# Patient Record
Sex: MALE | Race: WHITE
[De-identification: names, ages, dates, MRNs, and addresses within clinical notes are randomized per-mention and may not be internally consistent; named-entity substitution may affect disease eponyms.]

---

## 2019-09-16 ENCOUNTER — HOSPITAL ENCOUNTER (INPATIENT)
Dept: HOSPITAL 41 - JD.SDS | Age: 64
LOS: 1 days | Discharge: HOME | DRG: 302 | End: 2019-09-17
Attending: ORTHOPAEDIC SURGERY | Admitting: ORTHOPAEDIC SURGERY
Payer: COMMERCIAL

## 2019-09-16 DIAGNOSIS — Z87.891: ICD-10-CM

## 2019-09-16 DIAGNOSIS — F32.9: ICD-10-CM

## 2019-09-16 DIAGNOSIS — I10: ICD-10-CM

## 2019-09-16 DIAGNOSIS — G25.81: ICD-10-CM

## 2019-09-16 DIAGNOSIS — G89.18: ICD-10-CM

## 2019-09-16 DIAGNOSIS — Z79.899: ICD-10-CM

## 2019-09-16 DIAGNOSIS — Z79.82: ICD-10-CM

## 2019-09-16 DIAGNOSIS — M17.11: Primary | ICD-10-CM

## 2019-09-16 DIAGNOSIS — G20: ICD-10-CM

## 2019-09-16 DIAGNOSIS — Z90.89: ICD-10-CM

## 2019-09-16 DIAGNOSIS — R32: ICD-10-CM

## 2019-09-16 DIAGNOSIS — G89.29: ICD-10-CM

## 2019-09-16 DIAGNOSIS — E66.9: ICD-10-CM

## 2019-09-16 DIAGNOSIS — N32.81: ICD-10-CM

## 2019-09-16 DIAGNOSIS — F41.9: ICD-10-CM

## 2019-09-16 PROCEDURE — C1713 ANCHOR/SCREW BN/BN,TIS/BN: HCPCS

## 2019-09-16 PROCEDURE — 3E0T3BZ INTRODUCTION OF ANESTHETIC AGENT INTO PERIPHERAL NERVES AND PLEXI, PERCUTANEOUS APPROACH: ICD-10-PCS

## 2019-09-16 PROCEDURE — C1776 JOINT DEVICE (IMPLANTABLE): HCPCS

## 2019-09-16 PROCEDURE — 0SRC0J9 REPLACEMENT OF RIGHT KNEE JOINT WITH SYNTHETIC SUBSTITUTE, CEMENTED, OPEN APPROACH: ICD-10-PCS | Performed by: ORTHOPAEDIC SURGERY

## 2019-09-16 RX ADMIN — Medication SCH EACH: at 20:29

## 2019-09-16 RX ADMIN — OXYCODONE HYDROCHLORIDE AND ACETAMINOPHEN PRN TAB: 5; 325 TABLET ORAL at 18:06

## 2019-09-16 RX ADMIN — KETOROLAC TROMETHAMINE PRN MG: 15 INJECTION, SOLUTION INTRAMUSCULAR; INTRAVENOUS at 18:05

## 2019-09-16 RX ADMIN — OXYCODONE HYDROCHLORIDE AND ACETAMINOPHEN PRN TAB: 5; 325 TABLET ORAL at 12:43

## 2019-09-16 RX ADMIN — CEFUROXIME ONE: 750 INJECTION, POWDER, FOR SOLUTION INTRAMUSCULAR; INTRAVENOUS at 15:06

## 2019-09-16 RX ADMIN — Medication SCH EACH: at 15:01

## 2019-09-16 RX ADMIN — CEFUROXIME ONE MG: 750 INJECTION, POWDER, FOR SOLUTION INTRAMUSCULAR; INTRAVENOUS at 08:45

## 2019-09-16 RX ADMIN — Medication SCH: at 15:01

## 2019-09-16 NOTE — PCM.SN
- Free Text/Narrative


Note: 





Right selective femoral nerve block at the adductor canal for post-procedure 

pain control


Time Out: 0924


Start: 0924


End:  0930





Chart reviewed.  Consent signed.  Questions answered. Appropriate monitors 

applied.  Time out performed. Right mid-shaft femur evaluated with ultrasound.  

Scanning medially femur, I was able to identify the femoral artery in the 

adductor canal.  The saphenous nerve was lateral to the artery. The skin was 

prepped lateral to the ultrasound probe with chlorahexadine. The 21ga 4 

insulated block needle was inserted under direct ultrasound guidance into the 

adductor canal.  20mL of 0.5% ropivacaine with 1:200,000 epinephrine was 

injected cirmcumferentially about the nerve with intermittent negative 

aspiration every 5mL.  Patient tolerated the procedure well. No complications 

noted. See pictures on progress note and vital signs on nurses notes.  Block 

completed postoperatively.  





Arianna Vasquez CRNA

## 2019-09-16 NOTE — PCM.PREANE
Preanesthetic Assessment





- Procedure


Proposed Procedure: 





Right Total Knee Arthroplasty





- Anesthesia/Transfusion/Family Hx


Anesthesia History: Prior Anesthesia Reaction


Type of Anesthesia Reaction: Other (see below) ("I woke up angry")


Transfusion History: No Prior Transfusion(s)





- Review of Systems


General: No Symptoms


Pulmonary: No Symptoms


Cardiovascular: Edema (Bilateral Edema noted to legs. Pitting Edema. Chronic/

unchanged per patient. )


Gastrointestinal: No Symptoms, Constipation (Concerned regarding postoperative 

constipation. Dr. Espinal/Ailyn, PAC aware. Plan in place to manage.)


Neurological: Numbness (at times he notices numbness in his left leg. Worsened 

by sitting in one place to long. ), Pre-Existing Deficit (Parkinsons with 

tremors. Took his medication this moring. Lower back surgery in 1985, disc 

removal. )


Other: Reports: Anxiety





- Physical Assessment


NPO Status Date: 09/15/19


NPO Status Time: 21:00


Vital Signs: 





 Last Vital Signs











Temp  36.9 C   09/16/19 06:15


 


Pulse  72   09/16/19 06:15


 


Resp  16   09/16/19 06:15


 


BP  126/59 L  09/16/19 06:15


 


Pulse Ox  95   09/16/19 06:15











Height: 1.8 m


Weight: 113.852 kg


ASA Class: 3


Mental Status: Alert & Oriented x3


Airway Class: Mallampati = 3


Dentition: Reports: Crown(s) (Caps noted to several of his front teeth. One 

temporary cap in place on a molar. All secure. )


Thyro-Mental Finger Breadths: 2


Mouth Opening Finger Breadths: 3


ROM/Head Extension: Full


Lungs: Clear to Auscultation, Normal Respiratory Effort


Cardiovascular: Regular Rate, Regular Rhythm





- Lab


Values: 





 Laboratory Last Values











PT  10.9 SECONDS (9.7-12.0)   09/16/19  06:30    


 


INR  1.00   09/16/19  06:30    


 


APTT  27 SECONDS (22-31)   09/16/19  06:30    


 


MRSA (PCR)  Negative   09/11/19  12:56    














- Allergies


Allergies/Adverse Reactions: 


 Allergies











Allergy/AdvReac Type Severity Reaction Status Date / Time


 


No Known Allergies Allergy   Verified 09/13/19 11:25














- Anesthesia Plan


Pre-Op Medication Ordered: Anxiolytic





- Acknowledgements


Anesthesia Type Planned: Spinal, Regional Block (Post operative Adductor Canal 

Block for pain control. )


Pt an Appropriate Candidate for the Planned Anesthesia: Yes


Alternatives and Risks of Anesthesia Discussed w Pt/Guardian: Yes


Pt/Guardian Understands and Agrees with Anesthesia Plan: Yes


Additional Comments: 





Dr. Vila called regarding history of abnormal stress test and edema of lower 

extremities. Stress test was indeterminant per report, he has good daily 

activity as a rancher, he did see cardiology and had an ECHO done (results 

reviewed)EF was 60%, Dr. Vila does not feel the patient needs to see 

cardiology prior to surgery. He denies chest pain/cardiac symptoms, EKG was 

done Sinus Rhythm at 68 bpm. Sonny agrees this morning and would like to proceed 

with surgery as planned. 


He does report sleeping in a recliner which is more comfortable with his 

Parkinson disease. He is agreeable to a spinal anesthetic. 





PreAnesthesia Questionnaire


HEENT History: Reports: None


Cardiovascular History: Reports: High Cholesterol


Respiratory History: Reports: Other (See Below)


Other Respiratory History: wheezing


Genitourinary History: Reports: Urinary Incontinence, Other (See Below)


Other Genitourinary History: frequency


OB/GYN History: Reports: None


Musculoskeletal History: Reports: Osteoarthritis


Neurological History: Reports: Parkinson's


Psychiatric History: Reports: Anxiety


Endocrine/Metabolic History: Reports: None


Hematologic History: Reports: None


Immunologic History: Reports: None


Oncologic (Cancer) History: Reports: None





- Past Surgical History


Head Surgeries/Procedures: Reports: None


HEENT Surgical History: Reports: None


Cardiovascular Surgical History: Reports: None


Respiratory Surgical History: Reports: None


GI Surgical History: Reports: Colonoscopy


Female  Surgical History: Reports: None


Male  Surgical History: Reports: None


Endocrine Surgical History: Reports: None


Other Neurological Surgeries/Procedures: back surgery- discectomy


Musculoskeletal Surgical History: Reports: None


Oncologic Surgical History: Reports: None


Dermatological Surgical History: Reports: Other (See Below)





- SUBSTANCE USE


Smoking Status *Q: Former Smoker


Tobacco Use Within Last Twelve Months: Snuff/Dip


Recreational Drug Use History: No





- HOME MEDS


Home Medications: 


 Home Meds





Aspirin [[place] Aspirin] 81 mg PO DAILY 09/13/19 [History]


Carbidopa/Levodopa [Carbidopa-Levodopa ] 1 tab PO QID 09/13/19 [History]


Cholecalciferol (Vitamin D3) [Vitamin D3] 5,000 unit PO DAILY 09/13/19 [History]


Losartan [Cozaar] 50 mg PO DAILY 09/13/19 [History]


Magnesium Oxide [Magnesium] 400 mg PO DAILY 09/13/19 [History]


Sertraline HCl 100 mg PO DAILY 09/13/19 [History]


Simvastatin 20 mg PO DAILY 09/13/19 [History]


rOPINIRole HCl [Ropinirole ER] 12 mg PO QAM 09/13/19 [History]











- CURRENT (IN HOUSE) MEDS


Current Meds: 





 Current Medications





Acetaminophen (Tylenol)  975 mg PO ONETIME [facility]


   Stop: 09/16/19 13:00


   Last Admin: 09/16/19 06:26 Dose:  975 mg


Bisacodyl (Dulcolax)  5 mg PO DAILY PRN


   PRN Reason: Constipation


Cyclobenzaprine HCl (Flexeril)  10 mg PO BID PRN


   PRN Reason: Spasms


Docusate Sodium (Colace)  100 mg PO BID SWAPNA


Famotidine (Pepcid)  20 mg PO Q12H [facility]


Lactated Ringer's (Ringers, Lactated)  1,000 mls @ 125 mls/hr IV ASDIRECTED [facility]


   Stop: 09/16/19 23:00


   Last Admin: 09/16/19 06:49 Dose:  125 mls/hr


Cefazolin Sodium/Dextrose 2 gm (/ Premix)  50 mls @ 100 mls/hr IV Q8H [facility]


   Stop: 09/17/19 07:29


Ketorolac Tromethamine (Toradol)  15 mg IVPUSH Q6H PRN


   PRN Reason: Pain


Lidocaine/Sodium Bicarbonate (Buffered Lidocaine 1% In Ns 8.4%)  0.25 ml IDERM 

ONETIME PRN


   PRN Reason: Prior to IV Start


   Stop: 09/16/19 18:00


   Last Admin: 09/16/19 06:49 Dose:  0.25 ml


Magnesium Hydroxide (Milk Of Magnesia)  30 ml PO BID PRN


   PRN Reason: Constipation


Morphine Sulfate (Morphine)  2 mg IVPUSH Q2H PRN


   PRN Reason: Breakthrough Pain


Naloxone HCl (Narcan)  0.1 mg IVPUSH Q5M PRN


   PRN Reason: Oversedation


Ondansetron HCl (Zofran)  4 mg IVPUSH Q6H PRN


   PRN Reason: Nausea/Vomiting


Oxycodone HCl (Oxycontin)  10 mg PO ONETIME [facility]


   Stop: 09/16/19 13:00


   Last Admin: 09/16/19 06:27 Dose:  10 mg


Oxycodone/Acetaminophen (Percocet 325-5 Mg)  1 - 2 tab PO Q4H PRN


   PRN Reason: Pain


Pregabalin (Lyrica)  50 mg PO ONETIME [facility]


   Stop: 09/16/19 13:00


   Last Admin: 09/16/19 06:26 Dose:  50 mg


Rivaroxaban (Xarelto)  10 mg PO DAILY [facility]


Senna (Senna)  8.6 mg PO BID PRN


   PRN Reason: Constipation


Sodium Chloride (Saline Flush)  10 ml FLUSH ASDIRECTED PRN


   PRN Reason: Keep Vein Open


   Stop: 09/16/19 23:00





Discontinued Medications





Acetaminophen (Tylenol)  975 mg PO ONETIME SWAP[facility]


Bupivacaine HCl (Sensorcaine-Mpf 0.25%) Confirm Administered Dose 10 ml .ROUTE 

.STK-MED ONE


   Stop: 09/16/19 06:39


Bupivacaine HCl (Sensorcaine-Mpf 0.25%) Confirm Administered Dose 20 ml .ROUTE 

.STK-MED ONE


   Stop: 09/16/19 06:59


Bupivacaine HCl (Sensorcaine-Mpf 0.25%) Confirm Administered Dose 10 ml .ROUTE 

.STK-MED ONE


   Stop: 09/16/19 07:02


Cefazolin Sodium (Ancef) Confirm Administered Dose 2 gm .ROUTE .STK-MED ONE


   Stop: 09/16/19 06:39


Cefazolin Sodium (Ancef) Confirm Administered Dose 2 gm .ROUTE .STK-MED ONE


   Stop: 09/16/19 07:06


Morphine Sulfate 8 mg/Epinephrine HCl 0.3 mg/Cefuroxime Sodium 750 mg/Ketorolac 

Tromethamine 30 mg/Sodium Chloride 27.9 ml  0 mg .XX ONETIME ONE


   Stop: 09/16/19 07:46


Epinephrine HCl (Adrenalin) Confirm Administered Dose 1 mg .ROUTE .STK-MED ONE


   Stop: 09/16/19 07:32


Lactated Ringer's (Ringers, Lactated) Confirm Administered Dose 1,000 mls @ as 

directed .ROUTE .STK-MED ONE


   Stop: 09/16/19 07:06


Lidocaine HCl (Xylocaine-Mpf 1%) Confirm Administered Dose 4 mls @ as directed 

.ROUTE .STK-MED ONE


   Stop: 09/16/19 07:07


Iodine (Iodine 2% Mild Tincture) Confirm Administered Dose 30 ml .ROUTE .STK-

MED ONE


   Stop: 09/16/19 06:39


Ketorolac Tromethamine (Toradol) Confirm Administered Dose 30 mg .ROUTE .STK-

MED ONE


   Stop: 09/16/19 07:06


Midazolam HCl (Versed 1 Mg/Ml) Confirm Administered Dose 2 mg .ROUTE .STK-MED 

ONE


   Stop: 09/16/19 07:07


Ondansetron HCl (Zofran) Confirm Administered Dose 4 mg .ROUTE .STK-MED ONE


   Stop: 09/16/19 07:06


Oxycodone HCl (Oxycontin)  10 mg PO ONETIME SWAP[facility]


Propofol (Diprivan  20 Ml) Confirm Administered Dose 400 mg .ROUTE .ST-MED ONE


   Stop: 09/16/19 07:06


Propofol (Diprivan  20 Ml) Confirm Administered Dose 200 mg .ROUTE .STK-MED ONE


   Stop: 09/16/19 07:39


Ropivacaine (Naropin 0.5%) Confirm Administered Dose 30 ml .ROUTE .ST-MED ONE


   Stop: 09/16/19 07:32


Tranexamic Acid (Cyklokapron) Confirm Administered Dose 1,000 mg .ROUTE .STK-

MED ONE


   Stop: 09/16/19 06:38


Vancomycin HCl (Vancomycin) Confirm Administered Dose 1 gm .ROUTE .STK-MED ONE


   Stop: 09/16/19 06:39

## 2019-09-16 NOTE — PCM.POSTAN
POST ANESTHESIA ASSESSMENT





- MENTAL STATUS


Mental Status: Alert, Oriented





- VITAL SIGNS


Vital Signs: 


 Last Vital Signs











Temp  36.9 C   09/16/19 06:15


 


Pulse  72   09/16/19 06:15


 


Resp  16   09/16/19 06:15


 


BP  126/59 L  09/16/19 06:15


 


Pulse Ox  95   09/16/19 06:15














- RESPIRATORY


Respiratory Status: Respiratory Rate WNL, Airway Patent, O2 Saturation Stable, 

Supplemental Oxygen





- CARDIOVASCULAR


CV Status: Pulse Rate WNL, Blood Pressure Stable





- GASTROINTESTINAL


GI Status: No Symptoms





- PAIN


Pain Score: 0





- POST OP HYDRATION


Hydration Status: Adequate & Stable

## 2019-09-16 NOTE — CR
Right knee: AP and lateral views of the right knee were obtained.

 

Comparison: No prior knee exam.

 

Knee prosthesis is seen.  Components are aligned.  Underlying bony 

structures are intact.  Soft tissue air is noted from the surgical 

procedure.

 

Impression:

1.  Satisfactory postoperative radiographic appearance of recently 

placed right knee prosthesis.

 

Diagnostic code #2

## 2019-09-17 RX ADMIN — KETOROLAC TROMETHAMINE PRN MG: 15 INJECTION, SOLUTION INTRAMUSCULAR; INTRAVENOUS at 09:32

## 2019-09-17 RX ADMIN — OXYCODONE HYDROCHLORIDE AND ACETAMINOPHEN PRN TAB: 5; 325 TABLET ORAL at 08:46

## 2019-09-17 RX ADMIN — Medication SCH EACH: at 05:52

## 2019-09-17 RX ADMIN — OXYCODONE HYDROCHLORIDE AND ACETAMINOPHEN PRN TAB: 5; 325 TABLET ORAL at 03:05

## 2019-09-17 RX ADMIN — Medication SCH EACH: at 00:17

## 2019-09-17 RX ADMIN — Medication SCH EACH: at 09:03

## 2019-09-17 RX ADMIN — Medication SCH EACH: at 15:44

## 2019-09-17 RX ADMIN — OXYCODONE HYDROCHLORIDE AND ACETAMINOPHEN PRN TAB: 5; 325 TABLET ORAL at 13:09

## 2019-09-17 NOTE — [id number]
Post Anesthesia Note





- EVALUATION WITHIN 48HRS OF ANESTHETIC


Vital Signs in Normal Range: Yes


Patient Participated in Evaluation: Yes


Respiratory Function Stable: Yes


Airway Patent: Yes


Cardiovascular Function Stable: Yes


Hydration Status Stable: Yes


Pain Control Satisfactory: Yes


Nausea and Vomiting Control Satisfactory: Yes


Mental Status Recovered: Yes (pain on and off- getting ready to shower)


Vital Signs: 


 Last Vital Signs











Temp  98.8 F   09/17/19 03:04


 


Pulse  81   09/17/19 03:04


 


Resp  20   09/17/19 03:04


 


BP  139/83   09/17/19 03:04


 


Pulse Ox  92 L  09/17/19 03:04

## 2019-09-17 NOTE — PCM.CONS
H&P History of Present Illness





- General


Date of Service: 09/17/19


Admit Problem/Dx: 


 Admission Diagnosis/Problem





Admission Diagnosis/Problem      Osteoarthritis of knee








Source of Information: Patient, Family, Old Records, Provider, RN Notes Reviewed


History Limitations: Reports: Physical Impairment





- History of Present Illness


Initial Comments - Free Text/Narative: 


This is a 62 yo white male with past medical hx/o HTN, HLD, OA/DJD, PD, RLS, OAB

, Depression and Class II Obesity who recently underwent RTKA. He is doing 

relatively well POD#1. His pain is controlled and denies any acute issues. His 

Hgb is stable at 13.9. The hospitalist services were consulted for post 

operative care.





- Related Data


Allergies/Adverse Reactions: 


 Allergies











Allergy/AdvReac Type Severity Reaction Status Date / Time


 


No Known Allergies Allergy   Verified 09/16/19 10:46











Home Medications: 


 Home Meds





Aspirin [[place] Aspirin] 81 mg PO DAILY 09/13/19 [History]


Cholecalciferol (Vitamin D3) [Vitamin D3] 5,000 unit PO DAILY 09/13/19 [History]


Losartan [Cozaar] 50 mg PO DAILY 09/13/19 [History]


Magnesium Oxide [Magnesium] 500 mg PO DAILY 09/13/19 [History]


Sertraline HCl 100 mg PO DAILY 09/13/19 [History]


Simvastatin 20 mg PO DAILY 09/13/19 [History]


rOPINIRole HCl [Ropinirole ER] 12 mg PO QAM 09/13/19 [History]


Carbidopa/Levodopa [Carbidopa-Levodopa ] 1 tab PO 5XDAY 09/16/19 [History]


Acetaminophen/oxyCODONE [Percocet 325-5 MG] 1 - 2 tab PO Q4H PRN #60 tablet 09/ 17/19 [Rx]


Bisacodyl [Dulcolax] 5 mg PO DAILY PRN  tablet 09/17/19 [Rx]


Cyclobenzaprine [Flexeril] 10 mg PO BID PRN #30 tablet 09/17/19 [Rx]


Docusate Sodium [Colace] 100 mg PO BID  cap 09/17/19 [Rx]


Famotidine [Pepcid] 20 mg PO Q12H  tablet 09/17/19 [Rx]


Magnesium Hydroxide [Milk of Magnesia] 30 ml PO BID PRN  cup 09/17/19 [Rx]


Rivaroxaban [Xarelto] 10 mg PO DAILY #30 tablet 09/17/19 [Rx]


Sennosides [Senna] 8.6 mg PO BID PRN  tablet 09/17/19 [Rx]











Past Medical History


HEENT History: Reports: None


Cardiovascular History: Reports: High Cholesterol


Respiratory History: Reports: Other (See Below)


Other Respiratory History: wheezing


Genitourinary History: Reports: Urinary Incontinence, Other (See Below)


Other Genitourinary History: frequency


OB/GYN History: Reports: None


Musculoskeletal History: Reports: Osteoarthritis


Neurological History: Reports: Parkinson's


Psychiatric History: Reports: Anxiety


Endocrine/Metabolic History: Reports: None


Hematologic History: Reports: None


Immunologic History: Reports: None


Oncologic (Cancer) History: Reports: None





- Past Surgical History


Head Surgeries/Procedures: Reports: None


HEENT Surgical History: Reports: None


Cardiovascular Surgical History: Reports: None


Respiratory Surgical History: Reports: None


GI Surgical History: Reports: Colonoscopy


Male  Surgical History: Reports: None


Endocrine Surgical History: Reports: None


Other Neurological Surgeries/Procedures: back surgery- discectomy


Musculoskeletal Surgical History: Reports: None


Oncologic Surgical History: Reports: None


Dermatological Surgical History: Reports: Other (See Below)





Social & Family History





- Family History


Family Medical History: Noncontributory





- Tobacco Use


Smoking Status *Q: Never Smoker


Used Tobacco, but Quit: Yes


Month/Year Tobacco Last Used: 1975


Second Hand Smoke Exposure: No





- Caffeine Use


Caffeine Use: Reports: None





- Alcohol Use


Days Per Week of Alcohol Use: 2


Number of Drinks Per Day: 3


Total Drinks Per Week: 6





- Recreational Drug Use


Recreational Drug Use: No


Drug Use in Last 12 Months: No





H&P Review of Systems





- Review of Systems:


Review Of Systems: See Below


General: Denies: Fever, Chills, Fatigue


HEENT: Reports: No Symptoms


Pulmonary: Denies: Shortness of Breath


Cardiovascular: Denies: Chest Pain, Dyspnea on Exertion, Lightheadedness


Gastrointestinal: Denies: Abdominal Pain, Nausea, Vomiting


Musculoskeletal: Reports: No Symptoms


Skin: Reports: No Symptoms


Psychiatric: Denies: Depression, Anxiety


Neurological: Reports: Difficulty Walking, Weakness, Gait Disturbance.  Denies: 

Confusion


Hematologic/Lymphatic: Reports: No Symptoms


Immunologic: Reports: No Symptoms





Exam





- Exam


Exam: See Below





- Vital Signs


Vital Signs: 


 Last Vital Signs











Temp  37.1 C   09/17/19 03:04


 


Pulse  81   09/17/19 03:04


 


Resp  20   09/17/19 03:04


 


BP  139/83   09/17/19 03:04


 


Pulse Ox  92 L  09/17/19 03:04











Weight: 116.165 kg





- Exam


General: Alert, Oriented, Cooperative


HEENT: Conjunctiva Clear, EACs Clear, EOMI, Hearing Intact, Mucosa Moist & Pink

, Nares Patent, Normal Nasal Septum, Posterior Pharynx Clear, Pupils Equal, 

Pupils Reactive


Neck: Supple, Trachea Midline


Lungs: Clear to Auscultation, Normal Respiratory Effort, Decreased Breath Sounds


Cardiovascular: Regular Rate, Regular Rhythm


GI/Abdominal Exam: Normal Bowel Sounds, Soft, Non-Tender, No Organomegaly, No 

Distention, No Abnormal Bruit


 (Male) Exam: Deferred


Rectal (Males) Exam: Deferred


Back Exam: Normal Inspection, Decreased Range of Motion


Extremities: Normal Inspection, Normal Range of Motion, Non-Tender, No Pedal 

Edema, Normal Capillary Refill


Peripheral Pulses: 2+: Dorsalis Pedis (L), Dorsalis Pedis (R)


Skin: Warm, Dry, Intact


Skin Alteration Location    (Drawings Not To Scale): 


  __________________________














  __________________________





 1 - dressed, covered and wrapped with polar care





Neuro Extensive - Mental Status: Oriented x3, Normal Cognition, Memory Intact


Neuro Extensive - Motor, Sensory, Reflexes: CN II-XII Intact, Abnormal Gait, 

Tremor


Psychiatric: Alert, Normal Mood.  No: Normal Affect





- Patient Data


Lab Results Last 24 hrs: 


 Laboratory Results - last 24 hr











  09/17/19 09/17/19 Range/Units





  05:30 05:30 


 


WBC  5.96   (4.23-9.07)  K/mm3


 


RBC  4.53 L   (4.63-6.08)  M/mm3


 


Hgb  13.9   (13.7-17.5)  gm/dl


 


Hct  42.6   (40.1-51.0)  %


 


MCV  94.0 H   (79.0-92.2)  fl


 


MCH  30.7   (25.7-32.2)  pg


 


MCHC  32.6   (32.2-35.5)  g/dl


 


RDW Std Deviation  46.1 H   (35.1-43.9)  fL


 


Plt Count  190   (163-337)  K/mm3


 


MPV  9.5   (9.4-12.3)  fl


 


Sodium   137  (136-145)  mEq/L


 


Potassium   4.2  (3.5-5.1)  mEq/L


 


Chloride   102  ()  mEq/L


 


Carbon Dioxide   26  (21-32)  mEq/L


 


Anion Gap   13.2  (5-15)  


 


BUN   23 H  (7-18)  mg/dL


 


Creatinine   1.2  (0.7-1.3)  mg/dL


 


Est Cr Clr Drug Dosing   66.24  mL/min


 


Estimated GFR (MDRD)   > 60  (>60)  mL/min


 


BUN/Creatinine Ratio   19.2 H  (14-18)  


 


Glucose   106  ()  mg/dL


 


Calcium   8.3 L  (8.5-10.1)  mg/dL


 


Total Bilirubin   1.0  (0.2-1.0)  mg/dL


 


AST   17  (15-37)  U/L


 


Alkaline Phosphatase   64  ()  U/L


 


Total Protein   7.1  (6.4-8.2)  g/dl


 


Albumin   3.4  (3.4-5.0)  g/dl


 


Globulin   3.7  gm/dL


 


Albumin/Globulin Ratio   0.9 L  (1-2)  











Result Diagrams: 


 09/17/19 05:30





 09/17/19 05:30





Consult PN Assessment/Plan


POD#: 1


Procedures: 


Procedures





ASSAY OF BLOOD/URIC ACID (08/21/19)


ASSAY OF CK (CPK) (04/10/14)


ASSAY OF LACTIC ACID (08/21/19)


ASSAY OF PSA TOTAL (04/10/14)


ASSAY THYROID STIM HORMONE (04/28/15)


C-REACTIVE PROTEIN (08/21/19)


CARDIOVASCULAR STRESS TEST (10/29/18)


COMPLETE CBC W/AUTO DIFF WBC (08/21/19)


COMPREHEN METABOLIC PANEL (08/21/19)


GLYCOSYLATED HEMOGLOBIN TEST (08/21/19)


HT MUSCLE IMAGE SPECT MULT (10/29/18)


LACTATE (LD) (LDH) ENZYME (04/10/14)


LIPID PANEL (08/21/19)


ROUTINE VENIPUNCTURE (08/21/19)


UR ALBUMIN QUANTITATIVE (04/28/15)


UR ALBUMIN SEMIQUANTITATIVE (04/10/14)


URINALYSIS AUTO W/O SCOPE (08/21/19)


URINALYSIS AUTO W/SCOPE (04/28/15)


VITAMIN B-12 (08/21/19)








Problem List Initiated/Reviewed/Updated: Yes


Plan: 


Assessment:


Acute:


RTKA 


   - POD #1


   - Defer Management to Primary Team


   


Post-Operative Care


   - Patient is clinically and hemodynamically stable


   - Hgb is at 13.9


   - He is drinking and eating well





Chronic: HTN, HLD, OA/DJD, PD, RLS, OAB/Urinary Incontinence, Depression and 

Obesity Class II





Plan: From the hospitalist standpoint, patient is doing relatively well. We 

recommend to continue current treatment and resume home medications. Any 

changes or further recommendations will be based on the patient course. Thank 

you for the opportunity to participate in the management of this patient. We 

will follow him along with you.








Requesting Provider: Dr. Espinal


Date Consult Requested: 09/16/19


Reason for Consult: Post Operative Care


Patient History Reviewed: Yes


Admission H&P Reviewed: Yes


Notified Requestor: Yes


Time Spent (in minutes): 15

## 2019-09-17 NOTE — [id number]
- General Info


Date of Service: 09/17/19


POD#: 1


Functional Status: Reports: Tolerating Diet, Ambulating, Urinating, Incentive 

Spirometry





- Patient Data


Vitals - Most Recent: 


 Last Vital Signs











Temp  98.8 F   09/17/19 03:04


 


Pulse  81   09/17/19 03:04


 


Resp  20   09/17/19 03:04


 


BP  139/83   09/17/19 03:04


 


Pulse Ox  92 L  09/17/19 03:04











Weight - Most Recent: 256 lb 1.6 oz


I&O - Last 24 Hours: 


 Intake & Output











 09/16/19 09/17/19 09/17/19





 22:59 06:59 14:59


 


Intake Total 1710 450 


 


Output Total 180 600 


 


Balance 1530 -150 











Lab Results Last 24 Hrs: 


 Laboratory Results - last 24 hr











  09/17/19 09/17/19 Range/Units





  05:30 05:30 


 


WBC  5.96   (4.23-9.07)  K/mm3


 


RBC  4.53 L   (4.63-6.08)  M/mm3


 


Hgb  13.9   (13.7-17.5)  gm/dl


 


Hct  42.6   (40.1-51.0)  %


 


MCV  94.0 H   (79.0-92.2)  fl


 


MCH  30.7   (25.7-32.2)  pg


 


MCHC  32.6   (32.2-35.5)  g/dl


 


RDW Std Deviation  46.1 H   (35.1-43.9)  fL


 


Plt Count  190   (163-337)  K/mm3


 


MPV  9.5   (9.4-12.3)  fl


 


Sodium   137  (136-145)  mEq/L


 


Potassium   4.2  (3.5-5.1)  mEq/L


 


Chloride   102  ()  mEq/L


 


Carbon Dioxide   26  (21-32)  mEq/L


 


Anion Gap   13.2  (5-15)  


 


BUN   23 H  (7-18)  mg/dL


 


Creatinine   1.2  (0.7-1.3)  mg/dL


 


Est Cr Clr Drug Dosing   66.24  mL/min


 


Estimated GFR (MDRD)   > 60  (>60)  mL/min


 


BUN/Creatinine Ratio   19.2 H  (14-18)  


 


Glucose   106  ()  mg/dL


 


Calcium   8.3 L  (8.5-10.1)  mg/dL


 


Total Bilirubin   1.0  (0.2-1.0)  mg/dL


 


AST   17  (15-37)  U/L


 


ALT   8 L  (16-63)  U/L


 


Alkaline Phosphatase   64  ()  U/L


 


Total Protein   7.1  (6.4-8.2)  g/dl


 


Albumin   3.4  (3.4-5.0)  g/dl


 


Globulin   3.7  gm/dL


 


Albumin/Globulin Ratio   0.9 L  (1-2)  











Med Orders - Current: 


 Current Medications





Aspirin (Aspirin)  81 mg PO DAILY [facility]


Bisacodyl (Dulcolax)  5 mg PO DAILY PRN


   PRN Reason: Constipation


Cholecalciferol (Vitamin D3)  5,000 unit PO DAILY [facility]


Cyclobenzaprine HCl (Flexeril)  10 mg PO BID PRN


   PRN Reason: Spasms


   Last Admin: 09/17/19 00:26 Dose:  10 mg


Docusate Sodium (Colace)  100 mg PO BID [facility]


   Last Admin: 09/16/19 20:28 Dose:  100 mg


Famotidine (Pepcid)  20 mg PO Q12H [facility]


   Last Admin: 09/16/19 20:27 Dose:  20 mg


Ketorolac Tromethamine (Toradol)  15 mg IVPUSH Q6H PRN


   PRN Reason: Pain


   Last Admin: 09/16/19 18:05 Dose:  15 mg


Magnesium Hydroxide (Milk Of Magnesia)  30 ml PO BID PRN


   PRN Reason: Constipation


   Last Admin: 09/17/19 05:52 Dose:  30 ml


Magnesium Oxide (Magnesium Oxide)  400 mg PO DAILY [facility]


Morphine Sulfate (Morphine)  2 mg IVPUSH Q2H PRN


   PRN Reason: Breakthrough Pain


Naloxone HCl (Narcan)  0.1 mg IVPUSH Q5M PRN


   PRN Reason: Oversedation


Ondansetron HCl (Zofran)  4 mg IVPUSH Q6H PRN


   PRN Reason: Nausea/Vomiting


Oxycodone/Acetaminophen (Percocet 325-5 Mg)  1 - 2 tab PO Q4H PRN


   PRN Reason: Pain


   Last Admin: 09/17/19 03:05 Dose:  1 tab


Ropinirole Er 12 Mg  0 each PO QAM [facility]


Carbidopa/Levodopa ( Mg Tab **Ptom)  0 each PO 1500,2000 [facility]


   Last Admin: 09/16/19 20:29 Dose:  1 each


Carbidopa/Levodopa ( Mg Tab **Ptom)  0 each PO 0100,0600,1000 [facility]


   Last Admin: 09/17/19 05:52 Dose:  1 each


Rivaroxaban (Xarelto)  10 mg PO DAILY [facility]


Senna (Senna)  8.6 mg PO BID PRN


   PRN Reason: Constipation


Sertraline HCl (Zoloft)  100 mg PO DAILY [facility]


Simvastatin (Zocor)  20 mg PO BEDTIME [facility]


   Last Admin: 09/16/19 20:29 Dose:  20 mg





Discontinued Medications





Acetaminophen (Tylenol)  975 mg PO ONETIME [facility]


Acetaminophen (Tylenol)  975 mg PO ONETIME [facility]


   Stop: 09/16/19 13:00


   Last Admin: 09/16/19 06:26 Dose:  975 mg


Bupivacaine HCl (Sensorcaine-Mpf 0.25%) Confirm Administered Dose 10 ml .ROUTE 

.STK-MED ONE


   Stop: 09/16/19 06:39


   Last Admin: 09/16/19 08:45 Dose:  30 ml


Bupivacaine HCl (Sensorcaine-Mpf 0.25%) Confirm Administered Dose 20 ml .ROUTE 

.STK-MED ONE


   Stop: 09/16/19 06:59


Bupivacaine HCl (Sensorcaine-Mpf 0.25%) Confirm Administered Dose 10 ml .ROUTE 

.STK-MED ONE


   Stop: 09/16/19 07:02


Cefazolin Sodium (Ancef) Confirm Administered Dose 2 gm .ROUTE .STK-MED ONE


   Stop: 09/16/19 06:39


   Last Admin: 09/16/19 08:41 Dose:  2 gm


Cefazolin Sodium (Ancef) Confirm Administered Dose 2 gm .ROUTE .STK-MED ONE


   Stop: 09/16/19 07:06


Morphine Sulfate 8 mg/Epinephrine HCl 0.3 mg/Cefuroxime Sodium 750 mg/Ketorolac 

Tromethamine 30 mg/Sodium Chloride 27.9 ml  0 mg .XX ONETIME ONE


   Stop: 09/16/19 07:46


   Last Admin: 09/16/19 15:06 Dose:  Not Given


Diphenhydramine HCl (Benadryl)  25 mg IVPUSH Q6H PRN


   PRN Reason: Pruritis


   Stop: 09/16/19 16:00


Epinephrine HCl (Adrenalin) Confirm Administered Dose 1 mg .ROUTE .STK-MED ONE


   Stop: 09/16/19 07:32


Lactated Ringer's (Ringers, Lactated)  1,000 mls @ 125 mls/hr IV ASDIRECTED [facility]


   Stop: 09/16/19 23:00


   Last Admin: 09/16/19 06:49 Dose:  125 mls/hr


Cefazolin Sodium/Dextrose 2 gm (/ Premix)  50 mls @ 100 mls/hr IV Q8H [facility]


   Stop: 09/17/19 07:29


   Last Admin: 09/17/19 06:01 Dose:  100 mls/hr


Lactated Ringer's (Ringers, Lactated) Confirm Administered Dose 1,000 mls @ as 

directed .ROUTE .STK-MED ONE


   Stop: 09/16/19 07:06


Lidocaine HCl (Xylocaine-Mpf 1%) Confirm Administered Dose 4 mls @ as directed 

.ROUTE .STK-MED ONE


   Stop: 09/16/19 07:07


Iodine (Iodine 2% Mild Tincture) Confirm Administered Dose 30 ml .ROUTE .STK-

MED ONE


   Stop: 09/16/19 06:39


   Last Admin: 09/16/19 08:39 Dose:  18 ml


Ketorolac Tromethamine (Toradol) Confirm Administered Dose 30 mg .ROUTE .STK-

MED ONE


   Stop: 09/16/19 07:06


Lidocaine/Sodium Bicarbonate (Buffered Lidocaine 1% In Ns 8.4%)  0.25 ml IDERM 

ONETIME PRN


   PRN Reason: Prior to IV Start


   Stop: 09/16/19 18:00


   Last Admin: 09/16/19 06:49 Dose:  0.25 ml


Midazolam HCl (Versed 1 Mg/Ml) Confirm Administered Dose 2 mg .ROUTE .STK-MED 

ONE


   Stop: 09/16/19 07:07


Ondansetron HCl (Zofran) Confirm Administered Dose 4 mg .ROUTE .STK-MED ONE


   Stop: 09/16/19 07:06


Ondansetron HCl (Zofran)  4 mg IVPUSH ONETIME PRN


   PRN Reason: Nausea/Vomiting


   Stop: 09/16/19 13:00


Oxycodone HCl (Oxycontin)  10 mg PO ONETIME [facility]


Oxycodone HCl (Oxycontin)  10 mg PO ONETIME [facility]


   Stop: 09/16/19 13:00


   Last Admin: 09/16/19 06:27 Dose:  10 mg


Carbidopa/Levodopa ( Mg Tab)  0 each PO QID [facility]


   Last Admin: 09/16/19 11:44 Dose:  1 each


Pregabalin (Lyrica)  50 mg PO ONETIME [facility]


   Stop: 09/16/19 13:00


   Last Admin: 09/16/19 06:26 Dose:  50 mg


Propofol (Diprivan  20 Ml) Confirm Administered Dose 400 mg .ROUTE .STK-MED ONE


   Stop: 09/16/19 07:06


Propofol (Diprivan  20 Ml) Confirm Administered Dose 200 mg .ROUTE .STK-MED ONE


   Stop: 09/16/19 07:39


Ropivacaine (Naropin 0.5%) Confirm Administered Dose 30 ml .ROUTE .STK-MED ONE


   Stop: 09/16/19 07:32


Sodium Chloride (Saline Flush)  10 ml FLUSH ASDIRECTED PRN


   PRN Reason: Keep Vein Open


   Stop: 09/16/19 23:00


Tranexamic Acid (Cyklokapron) Confirm Administered Dose 1,000 mg .ROUTE .STK-

MED ONE


   Stop: 09/16/19 06:38


   Last Admin: 09/16/19 08:52 Dose:  1,000 mg


Vancomycin HCl (Vancomycin) Confirm Administered Dose 1 gm .ROUTE .STK-MED ONE


   Stop: 09/16/19 06:39


   Last Admin: 09/16/19 08:49 Dose:  1 gm











- Exam


Wound/Incisions: Dressing Dry and Intact


General: Alert, Cooperative, No Acute Distress


Lungs: Normal Respiratory Effort


Extremities: Other (Suzanna's negative for RLE.  Mod effusion right knee.  )





- Problem List Review


Problem List Initiated/Reviewed/Updated: Yes





- My Orders


Last 24 Hours: 


 Active Orders 24 hr











 Category Date Time Status


 


 Patient Status [ADT] Routine ADT  09/16/19 09:55 Active


 


 Cooling Warming Measures [RC] ASDIRECTED Care  09/16/19 08:37 Inactive


 


 Oxygen Therapy [RC] ASDIRECTED Care  09/16/19 08:37 Active


 


 Pulse Oximetry [RC] ASDIRECTED Care  09/16/19 08:37 Active


 


 Ready for Discharge [RC] PER UNIT ROUTINE Care  09/17/19 08:35 Ordered


 


 Vital Signs [RC] Q15M Care  09/16/19 08:37 Inactive


 


 Regular Diet [DIET] Diet  09/16/19 Lunch Active


 


 Acetaminophen/oxyCODONE [Percocet 325-5 MG] Med  09/16/19 09:00 Active





 1 - 2 tab PO Q4H PRN   


 


 Aspirin Med  09/17/19 09:00 Active





 81 mg PO DAILY   


 


 Bisacodyl [Dulcolax] Med  09/16/19 09:00 Active





 5 mg PO DAILY PRN   


 


 Cholecalciferol (Vitamin D3) [Vitamin D3] Med  09/17/19 09:00 Active





 5,000 unit PO DAILY   


 


 Cyclobenzaprine [Flexeril] Med  09/16/19 09:00 Active





 10 mg PO BID PRN   


 


 Docusate Sodium [Colace] Med  09/16/19 21:00 Active





 100 mg PO BID   


 


 Famotidine [Pepcid] Med  09/16/19 21:00 Active





 20 mg PO Q12H   


 


 Ketorolac [Toradol] Med  09/16/19 09:00 Active





 15 mg IVPUSH Q6H PRN   


 


 Magnesium Hydroxide [Milk of Magnesia] Med  09/16/19 09:00 Active





 30 ml PO BID PRN   


 


 Magnesium Oxide Med  09/17/19 09:00 Active





 400 mg PO DAILY   


 


 Morphine Med  09/16/19 09:00 Active





 2 mg IVPUSH Q2H PRN   


 


 Naloxone [Narcan] Med  09/16/19 09:00 Active





 0.1 mg IVPUSH Q5M PRN   


 


 Ondansetron [Zofran] Med  09/16/19 09:00 Active





 4 mg IVPUSH Q6H PRN   


 


 Patient's Own Medication [Ptom] Med  09/17/19 01:00 Active





 0 each PO 0100,0600,1000   


 


 Patient's Own Medication [Ptom] Med  09/16/19 12:30 Active





 0 each PO 1500,2000   


 


 Patient's Own Medication [Ptom] Med  09/17/19 08:00 Active





 0 each PO QAM   


 


 Rivaroxaban [Xarelto] Med  09/17/19 09:00 Active





 10 mg PO DAILY   


 


 Sennosides [Senna] Med  09/16/19 09:00 Active





 8.6 mg PO BID PRN   


 


 Sertraline [Zoloft] Med  09/17/19 09:00 Active





 100 mg PO DAILY   


 


 Simvastatin [Zocor] Med  09/16/19 21:00 Active





 20 mg PO BEDTIME   








 Medication Orders





Aspirin (Aspirin)  81 mg PO DAILY [facility]


Bisacodyl (Dulcolax)  5 mg PO DAILY PRN


   PRN Reason: Constipation


Cholecalciferol (Vitamin D3)  5,000 unit PO DAILY [facility]


Cyclobenzaprine HCl (Flexeril)  10 mg PO BID PRN


   PRN Reason: Spasms


   Last Admin: 09/17/19 00:26  Dose: 10 mg


Docusate Sodium (Colace)  100 mg PO BID [facility]


   Last Admin: 09/16/19 20:28  Dose: 100 mg


Famotidine (Pepcid)  20 mg PO Q12H [facility]


   Last Admin: 09/16/19 20:27  Dose: 20 mg


Ketorolac Tromethamine (Toradol)  15 mg IVPUSH Q6H PRN


   PRN Reason: Pain


   Last Admin: 09/16/19 18:05  Dose: 15 mg


Magnesium Hydroxide (Milk Of Magnesia)  30 ml PO BID PRN


   PRN Reason: Constipation


   Last Admin: 09/17/19 05:52  Dose: 30 ml


Magnesium Oxide (Magnesium Oxide)  400 mg PO DAILY [facility]


Morphine Sulfate (Morphine)  2 mg IVPUSH Q2H PRN


   PRN Reason: Breakthrough Pain


Naloxone HCl (Narcan)  0.1 mg IVPUSH Q5M PRN


   PRN Reason: Oversedation


Ondansetron HCl (Zofran)  4 mg IVPUSH Q6H PRN


   PRN Reason: Nausea/Vomiting


Oxycodone/Acetaminophen (Percocet 325-5 Mg)  1 - 2 tab PO Q4H PRN


   PRN Reason: Pain


   Last Admin: 09/17/19 03:05  Dose: 1 tab


   Admin: 09/16/19 18:06  Dose: 2 tab


   Admin: 09/16/19 12:43  Dose: 2 tab


Ropinirole Er 12 Mg  0 each PO QAM [facility]


Carbidopa/Levodopa ( Mg Tab **Ptom)  0 each PO 1500,2000 [facility]


   Last Admin: 09/16/19 20:29  Dose: 1 each


   Admin: 09/16/19 15:01  Dose: 1 each


   Admin: 09/16/19 15:01 Dose:  Not Given


Carbidopa/Levodopa ( Mg Tab **Ptom)  0 each PO 0100,0600,1000 [facility]


   Last Admin: 09/17/19 05:52  Dose: 1 each


   Admin: 09/17/19 00:17  Dose: 1 each


Rivaroxaban (Xarelto)  10 mg PO DAILY [facility]


Senna (Senna)  8.6 mg PO BID PRN


   PRN Reason: Constipation


Sertraline HCl (Zoloft)  100 mg PO DAILY [facility]


Simvastatin (Zocor)  20 mg PO BEDTIME [facility]


   Last Admin: 09/16/19 20:29  Dose: 20 mg











- Assessment


Assessment           (Free Text/Narrative):: 





POD#1 - right TKA





- Plan


Plan                        (Free Text/Narrative):: 





1.  Xarelto, TEDs, frequent mobility.


2.  Hgb 13.9.


3.  Discharge to home today if cleared by Hospitalist service and therapies.





The pt's case was discussed with Dr. Espinal.

## 2019-09-18 NOTE — PCM.DCSUM1
**Discharge Summary





- Hospital Course


Brief History: Sonny is a 62 yo male who underwent right TKA with Dr. Espinal on 9- .  The procedure was completed under spinal anesthesia with MAC.  The pt 

tolerated the procedure well and was admitted to the Medical-Surgical Unit.  

The pt received Ancef josie-operatively.  He participated in P.T. and O.T. and 

progressed well.  He was allowed to WBAT and used a FWW for mobility.  The pt's 

surgical wound was dressed with a Mepilex dressing and remained clean and dry.  

On POD#1, the pt was started on Xarelto 10mg PO daily for VTE prophylaxis.  The 

pt used TEDs and SCDs also.  On POD#1, the pt's hemoglobin was 13.9.  Medical 

management was provided by the Hospitalist service and the pt's hospital course 

was uneventful.  On POD#1, the pt was deemed appropriate for discharge to home 

with his wife.





- Discharge Data


Discharge Date: 09/17/19


Discharge Disposition: Home, Self-Care 01


Condition: Good





- Referral to Home Health


Primary Care Physician: 


Naldo Perales MD








- Patient Summary/Data


Consults: 


 Consultations





09/16/19 06:36


OT Evaluation and Treatment [CONS] Routine 


PT Evaluation and Treatment [CONS] Routine 





09/16/19 06:43


Consult to Physician [CONS] Routine 














- Patient Instructions


Diet: Usual Diet as Tolerated


Activity: Apply Ice, As Tolerated, Elevate Extremity, Full Weight Bearing


Driving: Do Not Drive


Showering/Bathing: May Shower


Wound/Incision Care: Keep Operative Site/Wound Site Clean and Dry, Do NOT 

Change Dressing


Notify Provider of: Fever, Increased Pain, Swelling and Redness, Drainage, 

Nausea and/or Vomiting


Other/Special Instructions: Please get up and moving around EVERY HOUR while 

awake.  This helps to prevent blood clots.  Please use your walker and have 

help with mobility as needed.  Take a short walk in your home every hour while 

awake.  The Xarelto blood thinner medication daily as directed.  At home, 

please complete the exercises that you learned during the Hospital stay.  

Schedule for physical therapy.  Use the pain medication as needed.  The 

medication may cause drowsiness and constipation.  Contact your primary care 

provider for instructions if you are constipated.  You may use a stool softener 

like docusate sodium or Colace 100mg twice daily and/or a laxative like Miralax 

daily for constipation.  Increase your water and fiber intake while you are 

using the pain medication.  Discontinue use of the pain medication as soon as 

able.  Please do not use other medications that may cause drowsiness (other 

pain medications, anxiety pills, cold medications, sleeping pills, etc) while 

using the prescription pain medication.  Do not use alcohol while using the 

pain medication.  You may use acetaminophen or Tylenol for pain management, 

however, please ensure you are not using over 4000 mg or 4 grams of 

acetaminophen per day from all sources.  Your pain medication has 325mg of 

acetaminophen per tablet.  At this time, please do not use ibuprofen (Motrin, 

Advil) or naproxen (Aleve) for pain management as you are using the Xarelto.  

When the Xarelto course is completed in 4 to 6 weeks, you could use ibuprofen 

or naproxen for pain management (if this is allowed by your primary care 

provider).  Wear the CHELSEA hose during the day and you may remove these at night.

  Elevate the limb to decrease swelling.  Place ice to the area often.  Place a 

towel between your skin and the blue pad.  Use the incentive spirometer often.  

Take deep breaths throughout the day.  Please keep the dressing in place until 

follow-up.  Notify the Clinic if the dressing becomes saturated.  Increase your 

protein intake while you are healing.  If you have diabetes, please closely 

monitor your blood sugars and notify your primary care provider with abnormal 

values.  Elevated blood sugars increases the risk of infection.  Call the 

Clinic with questions or concerns - [phone].





- Discharge Plan


*PRESCRIPTION DRUG MONITORING PROGRAM REVIEWED*: No


*COPY OF PRESCRIPTION DRUG MONITORING REPORT IN PATIENT ADELFO: No


Prescriptions/Med Rec: 


Acetaminophen/oxyCODONE [Percocet 325-5 MG] 1 - 2 tab PO Q4H PRN #60 tablet


 PRN Reason: Pain


Cyclobenzaprine [Flexeril] 10 mg PO BID PRN #30 tablet


 PRN Reason: Spasms


Rivaroxaban [Xarelto] 10 mg PO DAILY #30 tablet


Home Medications: 


 Home Meds





Aspirin [[place] Aspirin] 81 mg PO DAILY 09/13/19 [History]


Cholecalciferol (Vitamin D3) [Vitamin D3] 5,000 unit PO DAILY 09/13/19 [History]


Losartan [Cozaar] 50 mg PO DAILY 09/13/19 [History]


Magnesium Oxide [Magnesium] 500 mg PO DAILY 09/13/19 [History]


Sertraline HCl 100 mg PO DAILY 09/13/19 [History]


Simvastatin 20 mg PO DAILY 09/13/19 [History]


rOPINIRole HCl [Ropinirole ER] 12 mg PO QAM 09/13/19 [History]


Carbidopa/Levodopa [Carbidopa-Levodopa ] 1 tab PO 5XDAY 09/16/19 [History]


Acetaminophen/oxyCODONE [Percocet 325-5 MG] 1 - 2 tab PO Q4H PRN #60 tablet 09/ 17/19 [Rx]


Bisacodyl [Dulcolax] 5 mg PO DAILY PRN  tablet 09/17/19 [Rx]


Cyclobenzaprine [Flexeril] 10 mg PO BID PRN #30 tablet 09/17/19 [Rx]


Docusate Sodium [Colace] 100 mg PO BID  cap 09/17/19 [Rx]


Famotidine [Pepcid] 20 mg PO Q12H  tablet 09/17/19 [Rx]


Magnesium Hydroxide [Milk of Magnesia] 30 ml PO BID PRN  cup 09/17/19 [Rx]


Rivaroxaban [Xarelto] 10 mg PO DAILY #30 tablet 09/17/19 [Rx]


Sennosides [Senna] 8.6 mg PO BID PRN  tablet 09/17/19 [Rx]








Patient Handouts:  Total Knee Replacement, Easy-to-Read


Referrals: 


Ailyn Mckeon PA-C [Physician Assistant] - 


(Please follow-up with AYSE Sanderson on 


Tuesday, September 24 at 1:15am, 


Wednesday, October 2 at 2:45,


& Friday, October 25 11:45.)





- Discharge Summary/Plan Comment


DC Time >30 min.: No





- Patient Data


Vitals - Most Recent: 


 Last Vital Signs











Temp  98.6 F   09/17/19 13:07


 


Pulse  90   09/17/19 13:07


 


Resp  19   09/17/19 13:07


 


BP  128/62   09/17/19 13:07


 


Pulse Ox  92 L  09/17/19 13:07











Weight - Most Recent: 256 lb 1.6 oz


I&O - Last 24 hours: 


 Intake & Output











 09/17/19 09/18/19 09/18/19





 22:59 06:59 14:59


 


Intake Total 360  


 


Balance 360  











Med Orders - Current: 


 Current Medications








Discontinued Medications





Acetaminophen (Tylenol)  975 mg PO ONETIME [facility]


Acetaminophen (Tylenol)  975 mg PO ONETIME [facility]


   Stop: 09/16/19 13:00


   Last Admin: 09/16/19 06:26 Dose:  975 mg


Aspirin (Aspirin)  81 mg PO DAILY [facility]


   Last Admin: 09/17/19 08:46 Dose:  81 mg


Bisacodyl (Dulcolax)  5 mg PO DAILY PRN


   PRN Reason: Constipation


Bupivacaine HCl (Sensorcaine-Mpf 0.25%) Confirm Administered Dose 10 ml .ROUTE 

.STK-MED ONE


   Stop: 09/16/19 06:39


   Last Admin: 09/16/19 08:45 Dose:  30 ml


Bupivacaine HCl (Sensorcaine-Mpf 0.25%) Confirm Administered Dose 20 ml .ROUTE 

.STK-MED ONE


   Stop: 09/16/19 06:59


Bupivacaine HCl (Sensorcaine-Mpf 0.25%) Confirm Administered Dose 10 ml .ROUTE 

.STK-MED ONE


   Stop: 09/16/19 07:02


Cefazolin Sodium (Ancef) Confirm Administered Dose 2 gm .ROUTE .STK-MED ONE


   Stop: 09/16/19 06:39


   Last Admin: 09/16/19 08:41 Dose:  2 gm


Cefazolin Sodium (Ancef) Confirm Administered Dose 2 gm .ROUTE .STK-MED ONE


   Stop: 09/16/19 07:06


Cholecalciferol (Vitamin D3)  5,000 unit PO DAILY [facility]


   Last Admin: 09/17/19 08:48 Dose:  5,000 unit


Morphine Sulfate 8 mg/Epinephrine HCl 0.3 mg/Cefuroxime Sodium 750 mg/Ketorolac 

Tromethamine 30 mg/Sodium Chloride 27.9 ml  0 mg .XX ONETIME ONE


   Stop: 09/16/19 07:46


   Last Admin: 09/16/19 15:06 Dose:  Not Given


Cyclobenzaprine HCl (Flexeril)  10 mg PO BID PRN


   PRN Reason: Spasms


   Last Admin: 09/17/19 12:48 Dose:  10 mg


Diphenhydramine HCl (Benadryl)  25 mg IVPUSH Q6H PRN


   PRN Reason: Pruritis


   Stop: 09/16/19 16:00


Docusate Sodium (Colace)  100 mg PO BID [facility]


   Last Admin: 09/17/19 08:49 Dose:  100 mg


Epinephrine HCl (Adrenalin) Confirm Administered Dose 1 mg .ROUTE .STK-MED ONE


   Stop: 09/16/19 07:32


Famotidine (Pepcid)  20 mg PO Q12H [facility]


   Last Admin: 09/17/19 08:48 Dose:  20 mg


Lactated Ringer's (Ringers, Lactated)  1,000 mls @ 125 mls/hr IV ASDIRECTED [facility]


   Stop: 09/16/19 23:00


   Last Admin: 09/16/19 06:49 Dose:  125 mls/hr


Cefazolin Sodium/Dextrose 2 gm (/ Premix)  50 mls @ 100 mls/hr IV Q8H [facility]


   Stop: 09/17/19 07:29


   Last Admin: 09/17/19 06:01 Dose:  100 mls/hr


Lactated Ringer's (Ringers, Lactated) Confirm Administered Dose 1,000 mls @ as 

directed .ROUTE .STK-MED ONE


   Stop: 09/16/19 07:06


Lidocaine HCl (Xylocaine-Mpf 1%) Confirm Administered Dose 4 mls @ as directed 

.ROUTE .STK-MED ONE


   Stop: 09/16/19 07:07


Iodine (Iodine 2% Mild Tincture) Confirm Administered Dose 30 ml .ROUTE .STK-

MED ONE


   Stop: 09/16/19 06:39


   Last Admin: 09/16/19 08:39 Dose:  18 ml


Ketorolac Tromethamine (Toradol)  15 mg IVPUSH Q6H PRN


   PRN Reason: Pain


   Last Admin: 09/17/19 09:32 Dose:  15 mg


Ketorolac Tromethamine (Toradol) Confirm Administered Dose 30 mg .ROUTE .STK-

MED ONE


   Stop: 09/16/19 07:06


Lidocaine/Sodium Bicarbonate (Buffered Lidocaine 1% In Ns 8.4%)  0.25 ml IDERM 

ONETIME PRN


   PRN Reason: Prior to IV Start


   Stop: 09/16/19 18:00


   Last Admin: 09/16/19 06:49 Dose:  0.25 ml


Magnesium Hydroxide (Milk Of Magnesia)  30 ml PO BID PRN


   PRN Reason: Constipation


   Last Admin: 09/17/19 05:52 Dose:  30 ml


Magnesium Oxide (Magnesium Oxide)  400 mg PO DAILY [facility]


   Last Admin: 09/17/19 08:44 Dose:  400 mg


Midazolam HCl (Versed 1 Mg/Ml) Confirm Administered Dose 2 mg .ROUTE .STK-MED 

ONE


   Stop: 09/16/19 07:07


Morphine Sulfate (Morphine)  2 mg IVPUSH Q2H PRN


   PRN Reason: Breakthrough Pain


Naloxone HCl (Narcan)  0.1 mg IVPUSH Q5M PRN


   PRN Reason: Oversedation


Ondansetron HCl (Zofran)  4 mg IVPUSH Q6H PRN


   PRN Reason: Nausea/Vomiting


Ondansetron HCl (Zofran) Confirm Administered Dose 4 mg .ROUTE .STK-MED ONE


   Stop: 09/16/19 07:06


Ondansetron HCl (Zofran)  4 mg IVPUSH ONETIME PRN


   PRN Reason: Nausea/Vomiting


   Stop: 09/16/19 13:00


Oxycodone HCl (Oxycontin)  10 mg PO ONETIME [facility]


Oxycodone HCl (Oxycontin)  10 mg PO ONETIME [facility]


   Stop: 09/16/19 13:00


   Last Admin: 09/16/19 06:27 Dose:  10 mg


Oxycodone/Acetaminophen (Percocet 325-5 Mg)  1 - 2 tab PO Q4H PRN


   PRN Reason: Pain


   Last Admin: 09/17/19 13:09 Dose:  2 tab


Carbidopa/Levodopa ( Mg Tab)  0 each PO QID [facility]


   Last Admin: 09/16/19 11:44 Dose:  1 each


Ropinirole Er 12 Mg  0 each PO QAM [facility]


   Last Admin: 09/17/19 08:40 Dose:  1 each


Carbidopa/Levodopa ( Mg Tab **Ptom)  0 each PO 1500,2000 [facility]


   Last Admin: 09/17/19 15:44 Dose:  1 each


Carbidopa/Levodopa ( Mg Tab **Ptom)  0 each PO 0100,0600,1000 [facility]


   Last Admin: 09/17/19 09:03 Dose:  1 each


Pregabalin (Lyrica)  50 mg PO ONETIME [facility]


   Stop: 09/16/19 13:00


   Last Admin: 09/16/19 06:26 Dose:  50 mg


Propofol (Diprivan  20 Ml) Confirm Administered Dose 400 mg .ROUTE .STK-MED ONE


   Stop: 09/16/19 07:06


Propofol (Diprivan  20 Ml) Confirm Administered Dose 200 mg .ROUTE .STK-MED ONE


   Stop: 09/16/19 07:39


Rivaroxaban (Xarelto)  10 mg PO DAILY [facility]


   Last Admin: 09/17/19 08:46 Dose:  10 mg


Ropivacaine (Naropin 0.5%) Confirm Administered Dose 30 ml .ROUTE .STK-MED ONE


   Stop: 09/16/19 07:32


Senna (Senna)  8.6 mg PO BID PRN


   PRN Reason: Constipation


Sertraline HCl (Zoloft)  100 mg PO DAILY [facility]


   Last Admin: 09/17/19 08:45 Dose:  100 mg


Simvastatin (Zocor)  20 mg PO BEDTIME [facility]


   Last Admin: 09/16/19 20:29 Dose:  20 mg


Sodium Chloride (Saline Flush)  10 ml FLUSH ASDIRECTED PRN


   PRN Reason: Keep Vein Open


   Stop: 09/16/19 23:00


Tranexamic Acid (Cyklokapron) Confirm Administered Dose 1,000 mg .ROUTE .STK-

MED ONE


   Stop: 09/16/19 06:38


   Last Admin: 09/16/19 08:52 Dose:  1,000 mg


Vancomycin HCl (Vancomycin) Confirm Administered Dose 1 gm .ROUTE .STK-MED ONE


   Stop: 09/16/19 06:39


   Last Admin: 09/16/19 08:49 Dose:  1 gm

## 2019-09-20 NOTE — PCM.OPNOTE
- General Post-Op/Procedure Note


Date of Surgery/Procedure: 09/16/19


Operative Procedure(s): right total knee arthroplasty


Pre Op Diagnosis: right knee osteoarthrosis


Post-Op Diagnosis: Same


Anesthesia Technique: Local, MAC, Spinal


Primary Surgeon: Jamey Espinal


Anesthesia Provider: Yojana Vasquez


Assistant: Ailyn Mckeon


Assistant: Leonie Nichols in mLs: 5


Complications: None


Condition: Good


Free Text/Narrative:: 





size 6/6


9mm


35x10


cemented

## 2019-10-25 ENCOUNTER — HOSPITAL ENCOUNTER (EMERGENCY)
Dept: HOSPITAL 41 - JD.ED | Age: 64
Discharge: HOME | End: 2019-10-25
Payer: MEDICARE

## 2019-10-25 DIAGNOSIS — F41.9: ICD-10-CM

## 2019-10-25 DIAGNOSIS — Z96.651: ICD-10-CM

## 2019-10-25 DIAGNOSIS — Z79.82: ICD-10-CM

## 2019-10-25 DIAGNOSIS — I82.441: Primary | ICD-10-CM

## 2019-10-25 DIAGNOSIS — Z79.899: ICD-10-CM

## 2019-10-25 DIAGNOSIS — Z87.891: ICD-10-CM

## 2019-10-25 DIAGNOSIS — E78.00: ICD-10-CM

## 2019-10-25 DIAGNOSIS — G20: ICD-10-CM

## 2019-10-25 NOTE — EDM.PDOC
ED HPI GENERAL MEDICAL PROBLEM





- General


Chief Complaint: Cardiovascular Problem


Stated Complaint: SENT BY ULTRASOUND FOR POSITIVE DVT


Time Seen by Provider: 10/25/19 16:46


Source of Information: Reports: Patient, RN Notes Reviewed





- History of Present Illness


INITIAL COMMENTS - FREE TEXT/NARRATIVE: 





64-year-old male has been sent over from the or for clinic due to finding of 

DVT right lower extremity he had a routine 5 week postop visit status post 

right knee replacement. He was found to have markedly increased swelling of the 

right lower leg in particular but also moderate swelling of the right knee. 

Ultrasound was done of the right lower extremity which did show DVT. Have not 

yet seen that report for details. He was sent here with the suggestion that he 

be admitted to the hospital medical service due to underlying history Parkinson'

s disease, some other comorbidities not felt to be a good candidate to treat 

outpatient at home by his Orthopedist. At this time he has no chest pain or 

shortness of breath. His leg discomfort is relatively mild. States he was on 

some type of a blood thinner medication up until about 8-10 days ago and since 

then has been on daily aspirin. No known history of blood clots.


  ** Right Lower Leg


Pain Score (Numeric/FACES): 0





- Related Data


 Allergies











Allergy/AdvReac Type Severity Reaction Status Date / Time


 


No Known Allergies Allergy   Verified 09/16/19 10:46











Home Meds: 


 Home Meds





Aspirin [[place] Aspirin] 81 mg PO DAILY 09/13/19 [History]


Cholecalciferol (Vitamin D3) [Vitamin D3] 5,000 unit PO DAILY 09/13/19 [History]


Losartan [Cozaar] 50 mg PO DAILY 09/13/19 [History]


Magnesium Oxide [Magnesium] 500 mg PO DAILY 09/13/19 [History]


Sertraline HCl 100 mg PO DAILY 09/13/19 [History]


Simvastatin 20 mg PO DAILY 09/13/19 [History]


rOPINIRole HCl [Ropinirole ER] 12 mg PO QAM 09/13/19 [History]


Carbidopa/Levodopa [Carbidopa-Levodopa ] 1 tab PO 5XDAY 09/16/19 [History]


Acetaminophen/oxyCODONE [Percocet 325-5 MG] 1 - 2 tab PO Q4H PRN #60 tablet 09/ 17/19 [Rx]


Bisacodyl [Dulcolax] 5 mg PO DAILY PRN  tablet 09/17/19 [Rx]


Cyclobenzaprine [Flexeril] 10 mg PO BID PRN #30 tablet 09/17/19 [Rx]


Docusate Sodium [Colace] 100 mg PO BID  cap 09/17/19 [Rx]


Famotidine [Pepcid] 20 mg PO Q12H  tablet 09/17/19 [Rx]


Magnesium Hydroxide [Milk of Magnesia] 30 ml PO BID PRN  cup 09/17/19 [Rx]


Rivaroxaban [Xarelto] 10 mg PO DAILY #30 tablet 09/17/19 [Rx]


Sennosides [Senna] 8.6 mg PO BID PRN  tablet 09/17/19 [Rx]


Apixaban [Eliquis] 10 mg PO BID #14 tablet 10/25/19 [Rx]











Past Medical History


HEENT History: Reports: None


Cardiovascular History: Reports: High Cholesterol


Respiratory History: Reports: Other (See Below)


Other Respiratory History: wheezing


Genitourinary History: Reports: Urinary Incontinence, Other (See Below)


Other Genitourinary History: frequency


OB/GYN History: Reports: None


Musculoskeletal History: Reports: Osteoarthritis


Neurological History: Reports: Parkinson's


Psychiatric History: Reports: Anxiety


Endocrine/Metabolic History: Reports: None


Hematologic History: Reports: None


Immunologic History: Reports: None


Oncologic (Cancer) History: Reports: None





- Past Surgical History


Head Surgeries/Procedures: Reports: None


HEENT Surgical History: Reports: None


Cardiovascular Surgical History: Reports: None


Respiratory Surgical History: Reports: None


GI Surgical History: Reports: Colonoscopy


Male  Surgical History: Reports: None


Endocrine Surgical History: Reports: None


Other Neurological Surgeries/Procedures: back surgery- discectomy


Musculoskeletal Surgical History: Reports: None, Knee Replacement


Other Musculoskeletal Surgeries/Procedures:: right knee-sept 16,2019


Oncologic Surgical History: Reports: None


Dermatological Surgical History: Reports: Other (See Below)





Social & Family History





- Family History


Family Medical History: Noncontributory





- Tobacco Use


Smoking Status *Q: Former Smoker


Used Tobacco, but Quit: Yes


Month/Year Tobacco Last Used: 20 yr





- Caffeine Use


Caffeine Use: Reports: Coffee, Soda





- Recreational Drug Use


Recreational Drug Use: No





ED ROS GENERAL





- Review of Systems


Review Of Systems: See Below


Constitutional: Denies: Fever, Chills, Diaphoresis


HEENT: Reports: No Symptoms


Respiratory: Denies: Shortness of Breath, Pleuritic Chest Pain


Cardiovascular: Denies: Chest Pain


GI/Abdominal: Denies: Abdominal Pain, Nausea, Vomiting


Musculoskeletal: Reports: Leg Pain (minimal discomfort R lower leg)


Skin: Denies: Rash, Erythema


Neurological: Denies: Numbness, Tingling, Difficulty Walking, Weakness





ED EXAM, GENERAL





- Physical Exam


Exam: See Below


General Appearance: Alert, No Apparent Distress


Eye Exam: Bilateral Eye: PERRL


Head: Atraumatic


Neck: Supple, Full Range of Motion, Other (no JVD)


Respiratory/Chest: No Respiratory Distress, Lungs Clear, Normal Breath Sounds


Cardiovascular: Regular Rate, Rhythm


Peripheral Pulses: 4+: Dorsalis Pedis (R)


GI/Abdominal: Soft


Extremities: Normal Range of Motion, Pedal Edema (there is moderate diffuse 

swelling of R knee, R lower leg, calf nontender, no warmth or erythema)


Neurological: Alert, Oriented, No Motor/Sensory Deficits


Skin Exam: Warm, Dry, Normal Color, Other (LLE not swollen or tender)





EKG INTERPRETATION


EKG Date: 10/25/19


Rhythm: NSR


Axis: Normal


P-Wave: Present


QRS: Normal


ST-T: Other (T-wave inversion in lead III, no ST elevation or depression.)





Course





- Vital Signs


Last Recorded V/S: 


 Last Vital Signs











Temp  98.0 F   10/25/19 16:07


 


Pulse  74   10/25/19 16:07


 


Resp  20   10/25/19 16:07


 


BP  123/78   10/25/19 16:07


 


Pulse Ox  93 L  10/25/19 16:07














- Orders/Labs/Meds


Orders: 


 Active Orders 24 hr











 Category Date Time Status


 


 EKG 12 Lead [EKG Documentation Completion] [RC] STAT Care  10/25/19 16:54 

Active


 


 Peripheral IV Care [RC] .AS DIRECTED Care  10/25/19 16:56 Active


 


 Chest 1V Frontal [CR] Stat Exams  10/25/19 16:54 Taken


 


 Peripheral IV Insertion Adult [OM.PC] Stat Oth  10/25/19 16:56 Ordered











Labs: 


 Laboratory Tests











  10/25/19 10/25/19 10/25/19 Range/Units





  17:10 17:10 17:10 


 


WBC  5.33    (4.23-9.07)  K/mm3


 


RBC  4.40 L    (4.63-6.08)  M/mm3


 


Hgb  13.1 L    (13.7-17.5)  gm/dl


 


Hct  41.3    (40.1-51.0)  %


 


MCV  93.9 H    (79.0-92.2)  fl


 


MCH  29.8    (25.7-32.2)  pg


 


MCHC  31.7 L    (32.2-35.5)  g/dl


 


RDW Std Deviation  44.9 H    (35.1-43.9)  fL


 


Plt Count  228    (163-337)  K/mm3


 


MPV  8.9 L    (9.4-12.3)  fl


 


Neut % (Auto)  72.0 H    (34.0-67.9)  %


 


Lymph % (Auto)  17.6 L    (21.8-53.1)  %


 


Mono % (Auto)  7.7    (5.3-12.2)  %


 


Eos % (Auto)  1.7    (0.8-7.0)  


 


Baso % (Auto)  0.8    (0.1-1.2)  %


 


Neut # (Auto)  3.84    (1.78-5.38)  K/mm3


 


Lymph # (Auto)  0.94 L    (1.32-3.57)  K/mm3


 


Mono # (Auto)  0.41    (0.30-0.82)  K/mm3


 


Eos # (Auto)  0.09    (0.04-0.54)  K/mm3


 


Baso # (Auto)  0.04    (0.01-0.08)  K/mm3


 


PT    11.2  (9.7-12.0)  SECONDS


 


INR    1.03  


 


APTT     (22-31)  SECONDS


 


Sodium   139   (136-145)  mEq/L


 


Potassium   3.9   (3.5-5.1)  mEq/L


 


Chloride   105   ()  mEq/L


 


Carbon Dioxide   27   (21-32)  mEq/L


 


Anion Gap   10.9   (5-15)  


 


BUN   20 H   (7-18)  mg/dL


 


Creatinine   1.0   (0.7-1.3)  mg/dL


 


Est Cr Clr Drug Dosing   78.44   mL/min


 


Estimated GFR (MDRD)   > 60   (>60)  mL/min


 


BUN/Creatinine Ratio   20.0 H   (14-18)  


 


Glucose   95   ()  mg/dL


 


Calcium   8.8   (8.5-10.1)  mg/dL


 


Total Bilirubin   0.7   (0.2-1.0)  mg/dL


 


AST   16   (15-37)  U/L


 


ALT   15 L   (16-63)  U/L


 


Alkaline Phosphatase   70   ()  U/L


 


Total Protein   7.0   (6.4-8.2)  g/dl


 


Albumin   3.5   (3.4-5.0)  g/dl


 


Globulin   3.5   gm/dL


 


Albumin/Globulin Ratio   1.0   (1-2)  














  10/25/19 Range/Units





  17:10 


 


WBC   (4.23-9.07)  K/mm3


 


RBC   (4.63-6.08)  M/mm3


 


Hgb   (13.7-17.5)  gm/dl


 


Hct   (40.1-51.0)  %


 


MCV   (79.0-92.2)  fl


 


MCH   (25.7-32.2)  pg


 


MCHC   (32.2-35.5)  g/dl


 


RDW Std Deviation   (35.1-43.9)  fL


 


Plt Count   (163-337)  K/mm3


 


MPV   (9.4-12.3)  fl


 


Neut % (Auto)   (34.0-67.9)  %


 


Lymph % (Auto)   (21.8-53.1)  %


 


Mono % (Auto)   (5.3-12.2)  %


 


Eos % (Auto)   (0.8-7.0)  


 


Baso % (Auto)   (0.1-1.2)  %


 


Neut # (Auto)   (1.78-5.38)  K/mm3


 


Lymph # (Auto)   (1.32-3.57)  K/mm3


 


Mono # (Auto)   (0.30-0.82)  K/mm3


 


Eos # (Auto)   (0.04-0.54)  K/mm3


 


Baso # (Auto)   (0.01-0.08)  K/mm3


 


PT   (9.7-12.0)  SECONDS


 


INR   


 


APTT  27  (22-31)  SECONDS


 


Sodium   (136-145)  mEq/L


 


Potassium   (3.5-5.1)  mEq/L


 


Chloride   ()  mEq/L


 


Carbon Dioxide   (21-32)  mEq/L


 


Anion Gap   (5-15)  


 


BUN   (7-18)  mg/dL


 


Creatinine   (0.7-1.3)  mg/dL


 


Est Cr Clr Drug Dosing   mL/min


 


Estimated GFR (MDRD)   (>60)  mL/min


 


BUN/Creatinine Ratio   (14-18)  


 


Glucose   ()  mg/dL


 


Calcium   (8.5-10.1)  mg/dL


 


Total Bilirubin   (0.2-1.0)  mg/dL


 


AST   (15-37)  U/L


 


ALT   (16-63)  U/L


 


Alkaline Phosphatase   ()  U/L


 


Total Protein   (6.4-8.2)  g/dl


 


Albumin   (3.4-5.0)  g/dl


 


Globulin   gm/dL


 


Albumin/Globulin Ratio   (1-2)  











Meds: 


Medications














Discontinued Medications














Generic Name Dose Route Start Last Admin





  Trade Name Freq  PRN Reason Stop Dose Admin


 


Sodium Chloride  10 ml  10/25/19 16:55  10/25/19 17:12





  Saline Flush  FLUSH   10 ml





  ASDIRECTED PRN   Administration





  Keep Vein Open   





     





     





     














- Re-Assessments/Exams


Free Text/Narrative Re-Assessment/Exam: 





10/26/19 07:41


As noted he was just taken off of xarelto about 8 to 10 days ago, went to daily 

aspirin.  The swelling of he R leg has come up over the past week to 10 days. 

US of LLE did show DVT in posterior tibial vein.  I did consult with our 

Hospitalist, Dr Montoya regarding disposition.  He is hemodynamically stable, 

no sx or findings to suggest PE.  He feels it is appropriate to initiate 

outpatient treatment, recomends  eliequis. Patient and his wife are comfortable 

with that.  Will start him on eliquis 10 mg bid for 1 week and than go to 5 mg 

bid.  Discharge instr. as documented. 





Departure





- Departure


Time of Disposition: 18:27


Disposition: Home, Self-Care 01


Condition: Fair


Clinical Impression: 


DVT (deep venous thrombosis)


Qualifiers:


 DVT location: lower extremity Affected thrombotic vein of extremity: tibial 

Chronicity: acute Laterality: right Qualified Code(s): I82.441 - Acute embolism 

and thrombosis of right tibial vein





Prescriptions: 


Apixaban [Eliquis] 10 mg PO BID #14 tablet


Instructions:  Deep Vein Thrombosis


Referrals: 


Jamey Espinal MD [Primary Care Provider] - 


Forms:  ED Department Discharge


Additional Instructions: 


eliquis 10 twice daily for 1 week, first dose this evening,  than 5 mg twice 

daily for 6 months.  Keep leg elevated when sitting as discussed.  Stop the 

daily aspirin.  Continue other medications as prescribed.  Follow up with Dr MANISHA Hoff in about 5 to 7 days for recheck, call Monday morning for appt.  Return to 

ED as needed if symptoms worsening in any way. 





- My Orders


Last 24 Hours: 


My Active Orders





10/25/19 16:54


EKG 12 Lead [EKG Documentation Completion] [RC] STAT 


Chest 1V Frontal [CR] Stat 





10/25/19 16:56


Peripheral IV Care [RC] .AS DIRECTED 


Peripheral IV Insertion Adult [OM.PC] Stat 














- Assessment/Plan


Last 24 Hours: 


My Active Orders





10/25/19 16:54


EKG 12 Lead [EKG Documentation Completion] [RC] STAT 


Chest 1V Frontal [CR] Stat 





10/25/19 16:56


Peripheral IV Care [RC] .AS DIRECTED 


Peripheral IV Insertion Adult [OM.PC] Stat

## 2019-10-26 NOTE — CR
Chest: Portable view of the chest was obtained.

 

Comparison: No prior chest x-ray.

 

Heart size is within normal limits for portable technique.  Tortuous 

thoracic aorta is seen.  Lungs are clear with no acute parenchymal 

change.  Bony structures are grossly intact.

 

Impression:

1.  Nothing acute is appreciated on portable chest x-ray.

 

Diagnostic code #1

## 2021-09-27 ENCOUNTER — HOSPITAL ENCOUNTER (OUTPATIENT)
Dept: HOSPITAL 41 - JD.SDS | Age: 66
Discharge: HOME | End: 2021-09-27
Attending: ORTHOPAEDIC SURGERY
Payer: MEDICARE

## 2021-09-27 DIAGNOSIS — G89.18: ICD-10-CM

## 2021-09-27 DIAGNOSIS — F41.9: ICD-10-CM

## 2021-09-27 DIAGNOSIS — E78.00: ICD-10-CM

## 2021-09-27 DIAGNOSIS — Z87.891: ICD-10-CM

## 2021-09-27 DIAGNOSIS — Z79.899: ICD-10-CM

## 2021-09-27 DIAGNOSIS — Z98.890: ICD-10-CM

## 2021-09-27 DIAGNOSIS — Z79.82: ICD-10-CM

## 2021-09-27 DIAGNOSIS — E66.3: ICD-10-CM

## 2021-09-27 DIAGNOSIS — M17.12: Primary | ICD-10-CM

## 2021-09-27 DIAGNOSIS — I10: ICD-10-CM

## 2021-09-27 PROCEDURE — 27447 TOTAL KNEE ARTHROPLASTY: CPT

## 2021-09-27 PROCEDURE — 97110 THERAPEUTIC EXERCISES: CPT

## 2021-09-27 PROCEDURE — 97116 GAIT TRAINING THERAPY: CPT

## 2021-09-27 PROCEDURE — C1776 JOINT DEVICE (IMPLANTABLE): HCPCS

## 2021-09-27 PROCEDURE — 73560 X-RAY EXAM OF KNEE 1 OR 2: CPT

## 2021-09-27 PROCEDURE — C1713 ANCHOR/SCREW BN/BN,TIS/BN: HCPCS

## 2021-09-27 PROCEDURE — 97161 PT EVAL LOW COMPLEX 20 MIN: CPT

## 2021-09-27 RX ADMIN — CEFUROXIME PRN MG: 750 INJECTION, POWDER, FOR SOLUTION INTRAMUSCULAR; INTRAVENOUS at 08:26

## 2021-09-27 RX ADMIN — DEXTROSE ONE GM: 5 SOLUTION INTRAVENOUS at 08:48

## 2021-09-27 RX ADMIN — DEXTROSE ONE GM: 5 SOLUTION INTRAVENOUS at 08:26

## 2021-09-27 RX ADMIN — CEFUROXIME PRN MG: 750 INJECTION, POWDER, FOR SOLUTION INTRAMUSCULAR; INTRAVENOUS at 08:42

## 2021-09-27 NOTE — PCM.SN.2
- Free Text/Narrative


Note: 





Left selective femoral nerve block at the adductor canal for post-procedure pain

control under US guidance requested by Dr. Espinal.


Time Out: 0933


Start: 0933


End: 0939





Chart reviewed.  Consent signed.  Questions answered. Appropriate monitors 

applied.  Time out performed. Left mid-shaft femur identified with ultrasound, 

scanning medially of femur, the femoral artery in the adductor canal visualized,

and the femoral nerve located laterally to the artery. The skin was prepped 

lateral to the ultrasound probe with chlorahexadine times two.  The 21ga 4 

insulated block needle was inserted under direct ultrasound guidance into the 

adductor canal.  25mL of 0.5% ropivacaine with 1:200,000 epinephrine was 

injected circumferentially around the nerve with intermittent negative 

aspiration noted.  Patient tolerated the procedure well.  Sterile technique 

noted along with sterile gloves, mask, and sterile probe cover.  See picture on 

progress note and vital signs on nurses notes.  Block completed in PACU.  


Srikanth Martinez CRNA





Time Documentation

## 2021-09-27 NOTE — [id number]
Post Anesthesia Note





- EVALUATION WITHIN 48HRS OF ANESTHETIC


Vital Signs in Normal Range: Yes


Patient Participated in Evaluation: Yes


Respiratory Function Stable: Yes


Airway Patent: Yes


Cardiovascular Function Stable: Yes


Hydration Status Stable: Yes


Pain Control Satisfactory: Yes


Nausea and Vomiting Control Satisfactory: Yes


Mental Status Recovered: Yes


Vital Signs: 


                                Last Vital Signs











Temp  37.1 C   09/27/21 10:30


 


Pulse  91   09/27/21 10:30


 


Resp  18   09/27/21 10:30


 


BP  133/76   09/27/21 10:30


 


Pulse Ox  91 L  09/27/21 10:30














- COMMENTS/OBSERVATIONS


Free Text/Narrative:: 





no anesthesia complications noted

## 2021-09-27 NOTE — CR
Left knee: AP and lateral views of the left knee were obtained.

 

Comparison: Prior left knee CT exam of 09/14/21.

 

Knee prosthesis is noted.  Components are aligned.  Soft tissue air is

 seen.  Underlying bony structures show nothing else acute.

 

Impression:

1.  Satisfactory postoperative radiographic appearance of recently 

placed left knee prosthesis.

 

Diagnostic code #2

## 2021-09-27 NOTE — PCM.POSTAN
POST ANESTHESIA ASSESSMENT





- MENTAL STATUS


Mental Status: Alert, Oriented





- VITAL SIGNS


Vital Signs: 


                                Last Vital Signs











Temp  37.1 C   09/27/21 06:30


 


Pulse  74   09/27/21 06:30


 


Resp  16   09/27/21 06:30


 


BP  140/94 H  09/27/21 06:30


 


Pulse Ox  95   09/27/21 06:30














- RESPIRATORY


Respiratory Status: Respiratory Rate WNL, Airway Patent, O2 Saturation Stable





- CARDIOVASCULAR


CV Status: Pulse Rate WNL, Blood Pressure Stable





- GASTROINTESTINAL


GI Status: No Symptoms





- PAIN


Pain Score: 0





- POST OP HYDRATION


Hydration Status: Adequate & Stable





- OBSERVATIONS


Free Text/Narrative:: 





no anesthesia complications noted

## 2021-09-27 NOTE — PCM.PREANE
Preanesthetic Assessment





- Procedure


Proposed Procedure: 





left total knee arthroplasty








- Anesthesia/Transfusion/Family Hx


Anesthesia History: Prior Anesthesia Without Reaction


Family History of Anesthesia Reaction: No


Transfusion History: No Prior Transfusion(s)





- Review of Systems


General: No Symptoms


Pulmonary: No Symptoms


Cardiovascular: Dyspnea on Exertion


Gastrointestinal: No Symptoms


Neurological: Numbness ("maybe a little in left foot")


Other: Reports: Neck Pain, Depression





- Physical Assessment


NPO Status Date: 09/26/21


NPO Status Time: 00:00


Height: 1.8 m


Weight: 111.7 kg


ASA Class: 3


Mental Status: Alert & Oriented x3


Airway Class: Mallampati = 1


Dentition: Reports: Normal Dentition, Fabens(s), Bridge


Thyro-Mental Finger Breadths: 2


Mouth Opening Finger Breadths: 3


ROM/Head Extension: Full


Lungs: Clear to Auscultation, Normal Respiratory Effort


Cardiovascular: Regular Rate, Regular Rhythm





- Allergies


Allergies/Adverse Reactions: 


                                    Allergies











Allergy/AdvReac Type Severity Reaction Status Date / Time


 


No Known Allergies Allergy   Verified 09/24/21 10:54














- Anesthesia Plan


Pre-Op Medication Ordered: None





- Acknowledgements


Anesthesia Type Planned: Spinal, Regional Block (left femoral nerve block for 

post-op pain control)


Pt an Appropriate Candidate for the Planned Anesthesia: Yes


Alternatives and Risks of Anesthesia Discussed w Pt/Guardian: Yes


Pt/Guardian Understands and Agrees with Anesthesia Plan: Yes





PreAnesthesia Questionnaire


HEENT History: Reports: Impaired Vision


Cardiovascular History: Reports: High Cholesterol, Hypertension, Other (See 

Below)


Other Cardiovascular History: Edema


Respiratory History: Reports: Other (See Below)


Other Respiratory History: wheezing


Genitourinary History: Reports: Neurogenic Bladder, Renal Calculus, Urinary 

Incontinence, Other (See Below)


Other Genitourinary History: frequency, bladder hypertonicity, neurogenic 

bladder


OB/GYN History: Reports: None


Musculoskeletal History: Reports: Back Pain, Chronic, Osteoarthritis


Neurological History: Reports: Parkinson's


Psychiatric History: Reports: Anxiety, Depression


Endocrine/Metabolic History: Reports: None


Hematologic History: Reports: None


Immunologic History: Reports: None


Oncologic (Cancer) History: Reports: None





- Past Surgical History


Head Surgeries/Procedures: Reports: None


HEENT Surgical History: Reports: None


Cardiovascular Surgical History: Reports: None


Respiratory Surgical History: Reports: None


GI Surgical History: Reports: Colonoscopy, Other (See Below)


Other GI Surgeries/Procedures: Umbilical hernia Sx.


Female  Surgical History: Reports: None


Male  Surgical History: Reports: None


Endocrine Surgical History: Reports: None


Other Neurological Surgeries/Procedures: back surgery- discectomy


Musculoskeletal Surgical History: Reports: None, Knee Replacement


Other Musculoskeletal Surgeries/Procedures:: Right TKA-sept 16,2019; low back sx


Oncologic Surgical History: Reports: None


Dermatological Surgical History: Reports: Other (See Below)





- SUBSTANCE USE


Tobacco Use Status *Q: Never Tobacco User


Recreational Drug Use History: No





- HOME MEDS


Home Medications: 


                                    Home Meds





Aspirin [[place] Aspirin] 81 mg PO DAILY 09/13/19 [History]


Cholecalciferol (Vitamin D3) [Vitamin D3] 5,000 unit PO DAILY 09/13/19 [History]


Magnesium Oxide [Magnesium] 500 mg PO DAILY 09/13/19 [History]


Simvastatin 20 mg PO DAILY 09/13/19 [History]


rOPINIRole HCl [Ropinirole ER] 8 mg PO QAM 09/13/19 [History]


Carbidopa/Levodopa [Carbidopa-Levodopa ] 1 tab PO QID 09/16/19 [History]


Magnesium Hydroxide [Milk of Magnesia] 30 ml PO BID PRN  cup 09/17/19 [Rx]


Sennosides [Senna] 8.6 mg PO BID PRN  tablet 09/17/19 [Rx]


bisacodyL [Dulcolax] 5 mg PO DAILY PRN  tablet 09/17/19 [Rx]











- CURRENT (IN HOUSE) MEDS


Current Meds: 





                               Current Medications





Morphine Sulfate 8 mg/Epinephrine HCl 0.3 mg/Cefuroxime Sodium 750 mg/Ketorolac 

Tromethamine 30 mg/Sodium Chloride 7.9 ml  0 mg .XX ASDIRECTED PRN


   PRN Reason: Pain


   Stop: 09/27/21 18:00


Lactated Ringer's (Ringers, Lactated)  1,000 mls @ 125 mls/hr IV ASDIRECTED SWAPNA


   Stop: 09/27/21 23:00


Lidocaine/Sodium Bicarbonate (Lidocaine 1%/Sod Bicarbonate In Ns 8.4% 1 Ml 

Syringe)  0.25 ml IDERM ONETIME PRN


   PRN Reason: Prior to IV Start


   Stop: 09/27/21 18:00


Sodium Chloride (Sodium Chloride 0.9% 10 Ml Syringe)  10 ml FLUSH ASDIRECTED PRN


   PRN Reason: Keep Vein Open


   Stop: 09/27/21 18:00

## 2021-10-12 NOTE — OR
DATE OF OPERATION:  09/27/2021

 

SURGEON:  Jamey Espinal MD

 

OPERATION PERFORMED:  Left total knee arthroplasty with [employer] robotics.

 

PREOPERATIVE DIAGNOSIS:

Left knee osteoarthrosis.

 

POSTOPERATIVE DIAGNOSIS:

Left knee osteoarthrosis.

 

ANESTHESIA:

Local MAC with spinal.

 

ANESTHESIA PROVIDER:

Jasbir Stephens.

 

ASSISTANTS:

1. Ailyn Mckeon PA-C.

2. Leonie Nichols LPN.

 

ESTIMATED BLOOD LOSS:

5 mL.

 

COMPLICATIONS:

None.

 

CONDITION:

Stable.

 

IMPLANT:

1. Ruma size 6 cemented PS femur.

2. [employer] size 6 cemented Universal tibial baseplate.

3. [place] [employer] size 6, 9 mm PS X3 polyethylene.

4. Ruma size 35 x 10 mm cemented asymmetric patella.

 

DESCRIPTION OF PROCEDURE:

The patient was identified in the preop holding area.  Proper site was marked

and identified by the surgeon.  The patient was taken back to the operating

theater where after adequate anesthesia, the patient's left lower extremity had

a nonsterile tourniquet applied and it was sterilely prepped and draped in the

usual sterile fashion.  OR time-out was performed.  The patient received 2 g IV

Ancef .  Leg roa was then applied to the left lower extremity.  At this time,

the left lower extremity was exsanguinated.  Tourniquet was insufflated to 250

mmHg .  Standard anterior incision was made.  Medial parapatellar arthrotomy was

created.  Deep fibers of the MCL were raised as well as anterior fat pad was

resected.  Attention was turned to the patella.  Patella measured 25 and

resected to a 15 for a 35 x 10 mm patella.  Drill holes were then drilled.

Attention was then turned to the femur.  Two 4.0 pins were placed intra-

incisionally for the [employer]es robotic array and then 2 more were placed on

the tibia 3 fingerbreadths below the tibial tubercle.  The Ruma Ramses robotic

arrays were placed on both the femur and the tibia then at this time as well as

checkpoints on the femur and tibia.  Hip center rotation was then obtained.  The

medial and lateral malleoli were marked.  At this time, 40 points were obtained

off the femur and the tibia for the Ruma Ramses robotic plan.  The patient's

knee was brought to full extension.  Varus and valgus stresses were applied and

then into 90 degrees of flexion with a curved osteotome.  Varus and valgus

stresses were applied.  At this time, [employer] robotic plan was done to 20

mm gaps in both flexion and extension.  [employer] robotic arm was then

brought in.  A straight saw blade was then used for the tibial cut, the anterior

femoral cut, the anterior chamfer cut, and the posterior femoral cut.  All bony

fragments were removed.  Saw blade was then switched out and the distal femoral

cut as well as the posterior chamfer cut was completed.  At this time, medial

and lateral menisci were resected as well as any posterior osteophytes.  A 6 mm

trial tibia was then placed, 6 mm trial femur was placed, and a 9 mm

polyethylene trial liner was placed.  The patient's knee was brought to full

extension and flexion.  Varus and valgus stresses were applied, was found to be

stable with no instability.  No signs of liftoff or loosening were noted.  At

this time, box cut was completed on the femur.  The pins were removed from the

femur and the tibia as well as the arrays and the checkpoints.  Cement was mixed

on the back table.  All cut surfaces were irrigated with pulse lavage irrigation

with Ancef and then completely dried.  Once the cement was ready, the [place]

size 6 mm cemented Universal tibial base plate having been previously stamped

and drilled, was then cemented in place on the tibia.  The Ruma size 6 mm

cemented femur was cemented into place.  The patient had a [place] size 6, 9 mm

polyethylene insert placed.  The patient's knee was brought to full extension.

Excess cement was removed.  A Ruma size 35 x 10 mm cemented asymmetric

patella was then cemented into place.  1 L of pulse lavage irrigation with Ancef

was irrigated through the knee along with 400 mL of Irrisept irrigation.

Periarticular injection was completed.  Topical tranexamic acid and vancomycin

powder were applied.  A #2 barbed suture was used for closure of the medial

parapatellar arthrotomy in flexion.  2-0 Vicryl and Stratafix were used for

subcutaneous closure.  Prineo was used for cutaneous closure.  The patient had a

sterile soft dressing applied.  The tibial holes were closed with nylon, and

this was also covered with a sterile soft dressing.  The patient had an ACE

wrap applied and was sent to PACU in stable condition.  The patient tolerated

the procedure well.

 

DD:  10/12/2021 11:21:52

DT:  10/12/2021 11:46:15  MMODAL

Job #:  [id number]

## 2021-10-12 NOTE — PCM.OPNOTE
- General Post-Op/Procedure Note


Date of Surgery/Procedure: 09/27/21


Operative Procedure(s): left total knee arthroplasty with [employer]ukas[employer] robotics


Pre Op Diagnosis: left knee osteoarthrosis


Post-Op Diagnosis: Same


Anesthesia Technique: Local, MAC, Spinal


Primary Surgeon: Jamey Espinal


Anesthesia Provider: Srikanth Martinez


Assistant: Ailyn Mckeon


Assistant: Leonie Nichols


EBL in mLs: 5


Complications: None


Condition: Good


Free Text/Narrative:: 


6/6


9


35x10